# Patient Record
Sex: FEMALE | Race: WHITE
[De-identification: names, ages, dates, MRNs, and addresses within clinical notes are randomized per-mention and may not be internally consistent; named-entity substitution may affect disease eponyms.]

---

## 2018-11-14 ENCOUNTER — HOSPITAL ENCOUNTER (OUTPATIENT)
Dept: HOSPITAL 63 - MAMMO | Age: 58
Discharge: HOME | End: 2018-11-14
Attending: PHYSICIAN ASSISTANT
Payer: COMMERCIAL

## 2018-11-14 DIAGNOSIS — Z12.31: Primary | ICD-10-CM

## 2018-11-14 PROCEDURE — 77067 SCR MAMMO BI INCL CAD: CPT

## 2018-12-03 NOTE — RAD
DATE: 11/14/2018



EXAM: DIGITAL SCREEN BILAT W/CAD



HISTORY: Routine screening



COMPARISON: None available



This study was interpreted with the benefit of Computerized Aided Detection

(CAD).





Breast Density: SCATTERED The breast parenchyma shows scattered fibroglandular

densities. Breast parenchyma level B.





FINDINGS: No suspicious breast densities are seen.  Minimal benign type

calcification is present.  No suspicious microcalcifications are evident.  





IMPRESSION: There is no mammographic evidence of malignancy in either breast.







BI-RADS CATEGORY: 2 BENIGN FINDING(S)



RECOMMENDED FOLLOW-UP: 12M 12 MONTH FOLLOW-UP



PQRS compliance statement: Patient information was entered into a reminder

system with a target due date     for the next mammogram.



Mammography is a sensitive method for finding small breast cancers, but it

does not detect them all and is not a substitute for careful clinical

examination.  A negative mammogram does not negate a clinically suspicious

finding and should not result in delay in biopsying a clinically suspicious

abnormality.



"Our facility is accredited by the [employer] Mammography

Program."

## 2020-11-25 ENCOUNTER — HOSPITAL ENCOUNTER (OUTPATIENT)
Dept: HOSPITAL 63 - MAMMO | Age: 60
End: 2020-11-25
Attending: PHYSICIAN ASSISTANT
Payer: COMMERCIAL

## 2020-11-25 DIAGNOSIS — Z12.31: Primary | ICD-10-CM

## 2020-11-25 PROCEDURE — 77067 SCR MAMMO BI INCL CAD: CPT

## 2020-11-25 PROCEDURE — 77063 BREAST TOMOSYNTHESIS BI: CPT

## 2020-11-25 NOTE — RAD
DATE: 11/25/2020 10:07 AM



EXAM: MAMMO CALOS SCREENING BILATERAL



HISTORY:  Screening



COMPARISON: 11/14/2018



Bilateral CC and MLO views of the breasts were performed. Bilateral breast

tomosynthesis was performed in CC and MLO projections.



This study was interpreted with the benefit of Computerized Aided Detection

(CAD).





FINDINGS:



Breast Density: SCATTERED  The breast parenchyma shows scattered

fibroglandular densities. Breast parenchyma level B





No suspicious masses, microcalcifications or architectural distortion is

present to suggest malignancy in either breast.





The visualized axillae are unremarkable. 



IMPRESSION: No mammographic evidence of malignancy. 



BI-RADS CATEGORY: 1 NEGATIVE



RECOMMENDED FOLLOW-UP: 12M 12 MONTH FOLLOW-UP Annual screening mammography is

recommended, unless clinically indicated sooner based on symptoms or change in

physical exam.



PQRS compliance statement: Patient information was entered into a reminder

system with a target due date for the next mammogram.



Mammography is a sensitive method for finding small breast cancers, but it

does not detect them all and is not a substitute for careful clinical

examination.  A negative mammogram does not negate a clinically suspicious

finding and should not result in delay in biopsying a clinically suspicious

abnormality.



"Our facility is accredited by the [employer] Mammography

Program."

## 2021-12-01 ENCOUNTER — HOSPITAL ENCOUNTER (OUTPATIENT)
Dept: HOSPITAL 63 - MAMMO | Age: 61
End: 2021-12-01
Attending: PHYSICIAN ASSISTANT
Payer: COMMERCIAL

## 2021-12-01 DIAGNOSIS — Z12.31: Primary | ICD-10-CM

## 2021-12-01 PROCEDURE — 77067 SCR MAMMO BI INCL CAD: CPT

## 2021-12-01 PROCEDURE — 77063 BREAST TOMOSYNTHESIS BI: CPT

## 2021-12-01 NOTE — RAD
BILATERAL DIGITAL SCREENING 2-D AND 3-D MAMMOGRAM



INDICATION: Routine screening. 



COMPARISON:  11/25/2020, 11/14/2018



Interpretation was made using CAD.



FINDINGS:

Breast Density: There are scattered areas of fibroglandular density. 



RIGHT BREAST: There is an anterior inner left breast asymmetry seen best on CC view approximately 5 c
m from the nipple with suggestion of architectural distortion.



LEFT BREAST: No suspicious masses, calcifications or areas of architectural distortion are seen.



IMPRESSION:

1.  Right inner breast asymmetry with possible architectural distortion. No evidence of malignancy in
 the left breast.



ASSESSMENT: BI-RADS 0: Incomplete -- Need Additional Imaging Evaluation and/or Prior Mammograms for C
omparison.



RECOMMENDATION: Diagnostic right mammogram with spot compression views and ultrasound if indicated.







The facility will notify the patient of the results via mail. Patient information will be entered int
o the mammography reminder system with a target recall date for the next mammogram. A reminder letter
 will be generated by the facility.





Electronically signed by: Milton Zuleta MD (12/1/2021 2:02 PM) [id number]

## 2021-12-23 ENCOUNTER — HOSPITAL ENCOUNTER (OUTPATIENT)
Dept: HOSPITAL 63 - MAMMO | Age: 61
End: 2021-12-23
Attending: PHYSICIAN ASSISTANT
Payer: COMMERCIAL

## 2021-12-23 DIAGNOSIS — R92.2: Primary | ICD-10-CM

## 2021-12-23 PROCEDURE — 76642 ULTRASOUND BREAST LIMITED: CPT

## 2021-12-23 PROCEDURE — 77065 DX MAMMO INCL CAD UNI: CPT

## 2021-12-23 NOTE — RAD
EXAM: Right breast diagnostic mammogram with tomosynthesis; right breast sonogram.



HISTORY: 61-year-old female presents for evaluation of asymmetry with possible distortion within the 
right breast demonstrated on a mammogram dated 12/1/2021.



TECHNIQUE: Full-field digital 2D and 3D tomosynthesis images and spot compression views of the right 
breast are obtained. Sonographic imaging of the right breast targeted to sites of mammographic abnorm
ality was performed.



COMPARISON: 12/1/2021, 11/25/2020, 11/14/2018



BREAST PARENCHYMAL DENSITY: Level B - Scattered fibroglandular densities.



FINDINGS: The asymmetry and possible associated architectural distortion of concern within the right 
breast is less conspicuous with the additional mammographic views. There is no convincing persistent 
suspicious mass. There is no suspicious calcification. There are few areas of nodularity elsewhere wi
thin the right breast which are stable compared to prior studies.



Sonographic imaging of the right breast demonstrates no suspicious finding. There is a prominent krysta
gn axillary lymph node with fatty hilum.



IMPRESSION: 

1. No convincing persistent suspicious finding within the right breast with additional mammographic a
nd sonographic images.

2. BI-RADS Category 3: Probably benign finding(s). Precautionary short term follow up with a diagnost
ic right breast mammogram in 6 months is recommended.



If your mammogram demonstrates that you have dense breast tissue, which could hide abnormalities, and
 if you have other risk factors for breast cancer that have been identified, you might benefit from s
upplemental screening tests that may be suggested by your ordering physician.  Dense breast tissue, i
n and of itself, is a relatively common condition.  This information is not provided to cause undue c
oncern, but rather to raise your awareness and to promote discussion with your physician regarding th
e presence of other risk factors, in addition to dense breast tissue. A report of your mammography re
sults will be sent to you and your physician.  You should contact your physician if you have any ques
tions or concerns regarding this report.  



Mammography is a sensitive method for finding small breast cancers, but it does not detect them all a
nd is not a substitute for careful clinical examination.  A negative mammogram does not negate a clin
ically suspicious finding and should not result in delay in biopsying a clinically suspicious abnorma
lity.



PQRS compliance statement -  Patient information was entered into a reminder system with a target due
 date for the next mammogram. 



"Our facility is accredited by the [employer] Mammography Program."



Electronically signed by: Effie Oleary MD (12/23/2021 3:21 PM) [id number]

## 2021-12-23 NOTE — RAD
EXAM: Right breast diagnostic mammogram with tomosynthesis; right breast sonogram.



HISTORY: 61-year-old female presents for evaluation of asymmetry with possible distortion within the 
right breast demonstrated on a mammogram dated 12/1/2021.



TECHNIQUE: Full-field digital 2D and 3D tomosynthesis images and spot compression views of the right 
breast are obtained. Sonographic imaging of the right breast targeted to sites of mammographic abnorm
ality was performed.



COMPARISON: 12/1/2021, 11/25/2020, 11/14/2018



BREAST PARENCHYMAL DENSITY: Level B - Scattered fibroglandular densities.



FINDINGS: The asymmetry and possible associated architectural distortion of concern within the right 
breast is less conspicuous with the additional mammographic views. There is no convincing persistent 
suspicious mass. There is no suspicious calcification. There are few areas of nodularity elsewhere wi
thin the right breast which are stable compared to prior studies.



Sonographic imaging of the right breast demonstrates no suspicious finding. There is a prominent krysta
gn axillary lymph node with fatty hilum.



IMPRESSION: 

1. No convincing persistent suspicious finding within the right breast with additional mammographic a
nd sonographic images.

2. BI-RADS Category 3: Probably benign finding(s). Precautionary short term follow up with a diagnost
ic right breast mammogram in 6 months is recommended.



If your mammogram demonstrates that you have dense breast tissue, which could hide abnormalities, and
 if you have other risk factors for breast cancer that have been identified, you might benefit from s
upplemental screening tests that may be suggested by your ordering physician.  Dense breast tissue, i
n and of itself, is a relatively common condition.  This information is not provided to cause undue c
oncern, but rather to raise your awareness and to promote discussion with your physician regarding th
e presence of other risk factors, in addition to dense breast tissue. A report of your mammography re
sults will be sent to you and your physician.  You should contact your physician if you have any ques
tions or concerns regarding this report.  



Mammography is a sensitive method for finding small breast cancers, but it does not detect them all a
nd is not a substitute for careful clinical examination.  A negative mammogram does not negate a clin
ically suspicious finding and should not result in delay in biopsying a clinically suspicious abnorma
lity.



PQRS compliance statement -  Patient information was entered into a reminder system with a target due
 date for the next mammogram. 



"Our facility is accredited by the [employer] Mammography Program."



Electronically signed by: Effie Oleary MD (12/23/2021 3:21 PM) [id number]

## 2022-01-06 ENCOUNTER — HOSPITAL ENCOUNTER (OUTPATIENT)
Dept: HOSPITAL 63 - DXRAD | Age: 62
End: 2022-01-06
Attending: PHYSICIAN ASSISTANT
Payer: COMMERCIAL

## 2022-01-06 DIAGNOSIS — Z78.0: ICD-10-CM

## 2022-01-06 DIAGNOSIS — M85.88: Primary | ICD-10-CM

## 2022-01-06 DIAGNOSIS — E55.9: ICD-10-CM

## 2022-01-06 PROCEDURE — 77080 DXA BONE DENSITY AXIAL: CPT

## 2022-01-06 NOTE — RAD
INDICATION: Screening for osteopenia/osteoporosis.  Postmenopausal screening



COMPARISON: None.



TECHNIQUE:  Bone densitometry was performed through the lumbar spine and proximal femur.



IMPRESSION: 



Lumbar Spine: 

BMD: 1.14

T-Score: -0.3

Range: Normal 



Proximal Femur:

BMD: 0.83

T-Score: -1.1

Range: Osteopenic 







World Health Organization Criteria for Bone Density:



T-Score:

> -1.0: Normal Range

< -1.0 to -2.5:  Osteopenic Range

< -2.5: Osteoporotic Range



Electronically signed by: Talon Doe MD (1/6/2022 3:46 PM) [id number]